# Patient Record
Sex: FEMALE | Race: WHITE | NOT HISPANIC OR LATINO | Employment: UNEMPLOYED | URBAN - METROPOLITAN AREA
[De-identification: names, ages, dates, MRNs, and addresses within clinical notes are randomized per-mention and may not be internally consistent; named-entity substitution may affect disease eponyms.]

---

## 2017-03-02 ENCOUNTER — ALLSCRIPTS OFFICE VISIT (OUTPATIENT)
Dept: OTHER | Facility: OTHER | Age: 57
End: 2017-03-02

## 2017-03-02 DIAGNOSIS — Z13.21 ENCOUNTER FOR SCREENING FOR NUTRITIONAL DISORDER: ICD-10-CM

## 2017-03-02 DIAGNOSIS — J44.9 CHRONIC OBSTRUCTIVE PULMONARY DISEASE (HCC): ICD-10-CM

## 2017-03-02 DIAGNOSIS — Z12.11 ENCOUNTER FOR SCREENING FOR MALIGNANT NEOPLASM OF COLON: ICD-10-CM

## 2017-04-26 ENCOUNTER — TRANSCRIBE ORDERS (OUTPATIENT)
Dept: ADMINISTRATIVE | Facility: HOSPITAL | Age: 57
End: 2017-04-26

## 2017-04-26 DIAGNOSIS — Z12.31 ENCOUNTER FOR MAMMOGRAM TO ESTABLISH BASELINE MAMMOGRAM: Primary | ICD-10-CM

## 2017-04-28 ENCOUNTER — HOSPITAL ENCOUNTER (OUTPATIENT)
Dept: RADIOLOGY | Age: 57
Discharge: HOME | End: 2017-04-28
Payer: COMMERCIAL

## 2017-04-28 DIAGNOSIS — Z12.31 ENCOUNTER FOR MAMMOGRAM TO ESTABLISH BASELINE MAMMOGRAM: ICD-10-CM

## 2017-04-28 DIAGNOSIS — Z13.820 ENCOUNTER FOR SCREENING FOR OSTEOPOROSIS: ICD-10-CM

## 2017-04-28 PROCEDURE — 77080 DXA BONE DENSITY AXIAL: CPT

## 2017-04-28 PROCEDURE — G0202 SCR MAMMO BI INCL CAD: HCPCS

## 2017-05-02 DIAGNOSIS — R92.8 OTHER ABNORMAL AND INCONCLUSIVE FINDINGS ON DIAGNOSTIC IMAGING OF BREAST: ICD-10-CM

## 2017-05-04 ENCOUNTER — LAB CONVERSION - ENCOUNTER (OUTPATIENT)
Dept: OTHER | Facility: OTHER | Age: 57
End: 2017-05-04

## 2017-05-04 LAB
25(OH)D3 SERPL-MCNC: 12 NG/ML (ref 30–100)
A/G RATIO (HISTORICAL): 2 (CALC) (ref 1–2.5)
ALBUMIN SERPL BCP-MCNC: 4.6 G/DL (ref 3.6–5.1)
ALP SERPL-CCNC: 95 U/L (ref 33–130)
ALT SERPL W P-5'-P-CCNC: 17 U/L (ref 6–29)
AST SERPL W P-5'-P-CCNC: 21 U/L (ref 10–35)
BASOPHILS # BLD AUTO: 0.3 %
BASOPHILS # BLD AUTO: 29 CELLS/UL (ref 0–200)
BILIRUB SERPL-MCNC: 0.5 MG/DL (ref 0.2–1.2)
BUN SERPL-MCNC: 6 MG/DL (ref 7–25)
BUN/CREA RATIO (HISTORICAL): 9 (CALC) (ref 6–22)
CALCIUM SERPL-MCNC: 9.7 MG/DL (ref 8.6–10.4)
CHLORIDE SERPL-SCNC: 105 MMOL/L (ref 98–110)
CO2 SERPL-SCNC: 25 MMOL/L (ref 20–31)
CREAT SERPL-MCNC: 0.69 MG/DL (ref 0.5–1.05)
DEPRECATED RDW RBC AUTO: 14.5 % (ref 11–15)
EGFR AFRICAN AMERICAN (HISTORICAL): 114 ML/MIN/1.73M2
EGFR-AMERICAN CALC (HISTORICAL): 98 ML/MIN/1.73M2
EOSINOPHIL # BLD AUTO: 1.6 %
EOSINOPHIL # BLD AUTO: 155 CELLS/UL (ref 15–500)
GAMMA GLOBULIN (HISTORICAL): 2.3 G/DL (CALC) (ref 1.9–3.7)
GLUCOSE (HISTORICAL): 95 MG/DL (ref 65–99)
HCT VFR BLD AUTO: 45.9 % (ref 35–45)
HGB BLD-MCNC: 15.6 G/DL (ref 11.7–15.5)
LYMPHOCYTES # BLD AUTO: 2328 CELLS/UL (ref 850–3900)
LYMPHOCYTES # BLD AUTO: 24 %
MCH RBC QN AUTO: 32.9 PG (ref 27–33)
MCHC RBC AUTO-ENTMCNC: 34 G/DL (ref 32–36)
MCV RBC AUTO: 96.9 FL (ref 80–100)
MONOCYTES # BLD AUTO: 417 CELLS/UL (ref 200–950)
MONOCYTES (HISTORICAL): 4.3 %
NEUTROPHILS # BLD AUTO: 6771 CELLS/UL (ref 1500–7800)
NEUTROPHILS # BLD AUTO: 69.8 %
PLATELET # BLD AUTO: 321 THOUSAND/UL (ref 140–400)
PMV BLD AUTO: 8.6 FL (ref 7.5–12.5)
POTASSIUM SERPL-SCNC: 4 MMOL/L (ref 3.5–5.3)
RBC # BLD AUTO: 4.74 MILLION/UL (ref 3.8–5.1)
SODIUM SERPL-SCNC: 141 MMOL/L (ref 135–146)
TOTAL PROTEIN (HISTORICAL): 6.9 G/DL (ref 6.1–8.1)
TSH SERPL DL<=0.05 MIU/L-ACNC: 1.13 MIU/L
WBC # BLD AUTO: 9.7 THOUSAND/UL (ref 3.8–10.8)

## 2017-05-08 ENCOUNTER — HOSPITAL ENCOUNTER (OUTPATIENT)
Dept: MAMMOGRAPHY | Facility: CLINIC | Age: 57
Discharge: HOME | End: 2017-05-08
Payer: COMMERCIAL

## 2017-05-08 DIAGNOSIS — R92.8 OTHER ABNORMAL AND INCONCLUSIVE FINDINGS ON DIAGNOSTIC IMAGING OF BREAST: ICD-10-CM

## 2017-05-08 PROCEDURE — G0206 DX MAMMO INCL CAD UNI: HCPCS

## 2017-05-11 ENCOUNTER — ALLSCRIPTS OFFICE VISIT (OUTPATIENT)
Dept: OTHER | Facility: OTHER | Age: 57
End: 2017-05-11

## 2017-05-12 ENCOUNTER — LAB CONVERSION - ENCOUNTER (OUTPATIENT)
Dept: OTHER | Facility: OTHER | Age: 57
End: 2017-05-12

## 2017-05-12 LAB — FECAL GLOBIN BY IMMUNOCHEM (HISTORICAL): NORMAL

## 2017-05-18 LAB
CHOLEST SERPL-MCNC: 249 MG/DL (ref 125–200)
CHOLEST/HDLC SERPL: 3.5 (CALC)
HDLC SERPL-MCNC: 72 MG/DL
LDL CHOLESTEROL (HISTORICAL): 159 MG/DL (CALC)
NON-HDL-CHOL (CHOL-HDL) (HISTORICAL): 177 MG/DL (CALC)
TRIGL SERPL-MCNC: 91 MG/DL

## 2017-12-07 ENCOUNTER — ALLSCRIPTS OFFICE VISIT (OUTPATIENT)
Dept: OTHER | Facility: OTHER | Age: 57
End: 2017-12-07

## 2017-12-07 PROCEDURE — G0145 SCR C/V CYTO,THINLAYER,RESCR: HCPCS | Performed by: FAMILY MEDICINE

## 2017-12-07 PROCEDURE — 87624 HPV HI-RISK TYP POOLED RSLT: CPT | Performed by: FAMILY MEDICINE

## 2017-12-11 ENCOUNTER — LAB REQUISITION (OUTPATIENT)
Dept: LAB | Facility: HOSPITAL | Age: 57
End: 2017-12-11
Payer: COMMERCIAL

## 2017-12-11 DIAGNOSIS — Z01.419 ENCOUNTER FOR GYNECOLOGICAL EXAMINATION WITHOUT ABNORMAL FINDING: ICD-10-CM

## 2017-12-13 LAB — HPV RRNA GENITAL QL NAA+PROBE: NORMAL

## 2017-12-14 LAB
LAB AP GYN PRIMARY INTERPRETATION: NORMAL
Lab: NORMAL

## 2018-01-13 VITALS
RESPIRATION RATE: 16 BRPM | SYSTOLIC BLOOD PRESSURE: 152 MMHG | BODY MASS INDEX: 24.91 KG/M2 | DIASTOLIC BLOOD PRESSURE: 80 MMHG | HEIGHT: 66 IN | WEIGHT: 155 LBS | HEART RATE: 90 BPM

## 2018-01-16 NOTE — PROGRESS NOTES
Assessment    1  COPD (chronic obstructive pulmonary disease) (496) (J44 9)   2  Encounter for preventive health examination (V70 0) (Z00 00)   3  History of Screening breast examination (V76 10) (Z12 39)   4  History of Osteoporosis screening (V82 81) (Z13 820)   5  Colon cancer screening (V76 51) (Z12 11)   6  Encounter for vitamin deficiency screening (V77 99) (Z13 21)   7  Cigarette smoker (305 1) (F17 210)    Plan  Colon cancer screening    · (1) OCCULT BLOOD, FECAL IMMUNOCHEMICAL TEST; Status:Active - Retrospective  Authorization; Requested for:02Mar2017;   COPD (chronic obstructive pulmonary disease)    · ProAir  (90 Base) MCG/ACT Inhalation Aerosol Solution; INHALE 2  PUFFS EVERY 4-6 HOURS AS NEEDED   · (1) CBC/PLT/DIFF; Status:Active - Retrospective Authorization; Requested  for:02Mar2017;    · (1) COMPREHENSIVE METABOLIC PANEL; Status:Active - Retrospective Authorization; Requested for:02Mar2017;    · (1) LIPID PANEL, FASTING; Status:Active - Retrospective Authorization; Requested  for:02Mar2017;    · (1) TSH; Status:Active - Retrospective Authorization; Requested for:02Mar2017;   Encounter for vitamin deficiency screening    · (1) VITAMIN D 25-HYDROXY; Status:Active - Retrospective Authorization; Requested  for:02Mar2017; Health Maintenance    · Begin or continue regular aerobic exercise  Gradually work up to at least 3 sessions of 30  minutes of exercise a week ; Status:Complete;   Done: [id number] 11:48PM  SocHx: Cigarette smoker    · We recommend you quit smoking  Time spent counseling today was greater than 3  minutes ; Status:Complete;   Done: [id number] 11:48PM    Check BW, mammogram, Dexa scan, hemoccult  RV to review above and Gyn exam      Discussion/Summary  Advice and education were given regarding weight bearing exercise, calcium supplements, vitamin D supplements and tobacco cessation  1  HM-needs colorectal screening, Gyn exam  mammogram Advised pneumonia shots , Tdap     2  COPD-strongly advised to quit smoking   Suggested PFT's  Requestrs ProAir  3  Cigarette smoker-advised to quit  REcommended lung caner CT screening  History of Present Illness  HM, Adult Female: The patient is being seen for a health maintenance evaluation  General Health: The patient's health since the last visit is described as good  She complains of vision problems  Vision care includes an eye examination more than a year ago  She denies hearing loss  Lifestyle:  She consumes a diverse and healthy diet  (Watching diet  Avoids sweets  Eats vegetables  No soda  )   She exercises regularly  (Has not been exercising  HAs a callous on her foot  )   She uses tobacco  Tobacco Use Duration: 1 cigarette pack(s) per day and started age 13 pack year(s) of cigarette use  She is not ready to quit using tobacco  She consumes alcohol (Se)  She reports drinking several beerzs daily drinks per day  Caffeine several cups daily  No soda  Dietary calcium several servings cheese  Reproductive health: the patient is postmenopausal   LMP about 2 years ago  Screening:   HPI: Kiley Valentin says she has been doing well  She has had a few falls  She is currently unemployed  She has a history of asthma  She gets occ wheezing and uses her albuterol inhaler about once a week  Past Medical History    · History of Osteoporosis screening (V82 81) (Z13 820)   · History of Screening breast examination (V76 10) (Z12 39)    Family History  Mother    · Family history of hypertension (V17 49) (Z82 49)   · Family history of malignant neoplasm of breast (V16 3) (Z80 3)   · Family history of Type 2 diabetes mellitus with hyperglycemia  Family History    · Denied: Family history of osteoporosis    Social History    · Cigarette smoker (305 1) (F17 210)    Current Meds   1  ProAir  (90 Base) MCG/ACT Inhalation Aerosol Solution Recorded    Allergies    1   No Known Drug Allergies    Vitals   Recorded: [id number] 06:05PM   Heart Rate 120   Respiration 18   Systolic 650   Diastolic 84   Height 5 ft 6 in   Weight 154 lb 8 0 oz   BMI Calculated 24 94   BSA Calculated 1 8     Physical Exam    Constitutional   General appearance: No acute distress, well appearing and well nourished  Head and Face   Head and face: Normal     Eyes   Conjunctiva and lids: No swelling, erythema or discharge  Pupils and irises: Equal, round, reactive to light  Ears, Nose, Mouth, and Throat   Otoscopic examination: Tympanic membranes translucent with normal light reflex  Canals patent without erythema  Lips, teeth, and gums: Abnormal   Poor dentition  Oropharynx: Normal with no erythema, edema, exudate or lesions  Neck   Neck: Supple, symmetric, trachea midline, no masses  Thyroid: Normal, no thyromegaly  Pulmonary   Respiratory effort: Abnormal   Diffusely decreased breath sounds  Auscultation of lungs: Clear to auscultation  Cardiovascular   Auscultation of heart: Normal rate and rhythm, normal S1 and S2, no murmurs  Carotid pulses: 2+ bilaterally  Abdominal aorta: Normal     Femoral pulses: 2+ bilaterally  Pedal pulses: 2+ bilaterally  Peripheral vascular exam: Normal     Examination of extremities for edema and/or varicosities: Normal     Abdomen   Abdomen: Non-tender, no masses  Liver and spleen: No hepatomegaly or splenomegaly  Lymphatic   Palpation of lymph nodes in neck: No lymphadenopathy  Palpation of lymph nodes in groin: No lymphadenopathy  Musculoskeletal   Gait and station: Normal     Psychiatric   Mood and affect: Normal        Future Appointments    Date/Time Provider Specialty Site   05/11/2017 07:00 PM ROHAN Culp   [facility]     Signatures   Electronically signed by : ROHAN Nixon ; Mar  2 2017 11:49PM EST                       (Author)

## 2018-01-22 VITALS
RESPIRATION RATE: 18 BRPM | BODY MASS INDEX: 24.83 KG/M2 | DIASTOLIC BLOOD PRESSURE: 84 MMHG | HEART RATE: 120 BPM | SYSTOLIC BLOOD PRESSURE: 146 MMHG | WEIGHT: 154.5 LBS | HEIGHT: 66 IN

## 2018-01-23 VITALS
HEART RATE: 84 BPM | RESPIRATION RATE: 16 BRPM | HEIGHT: 66 IN | BODY MASS INDEX: 24.27 KG/M2 | SYSTOLIC BLOOD PRESSURE: 142 MMHG | WEIGHT: 151 LBS | DIASTOLIC BLOOD PRESSURE: 86 MMHG

## 2018-01-23 NOTE — PROGRESS NOTES
Assessment    1  Encounter for cervical Pap smear with pelvic exam (V76 2,V72 31) (Z01 419)   2  Abnormal mammogram (793 80) (R92 8)    Plan  Abnormal mammogram    · MAMMO DIAGNOSTIC LEFT W CAD; Status:Active - Retrospective Authorization; Requested for:[id number];   Encounter for cervical Pap smear with pelvic exam    · (1) THIN PREP PAP WITH IMAGING; Status:Active - Retrospective Authorization; Requested for:[id number]; Maturation index required? : No  : 2015  HPV? : Regardless of Interpretation    Check Pap smear  Six-month diagnostic mammogram left breast      Discussion/Summary    Gyn exam -normal  Abnormal mammogram-due for f/u 6 month  History of Present Illness  HM, Adult Female: The patient is being seen for a gynecology evaluation  General Health:   Reproductive health:  she reports abnormal menses  (LMP 2 years ago  )   she is sexually active  Screening:      Review of Systems    Genitourinary: incontinence and occ stress incontinence, but no pelvic pain, no vaginal discharge and no unexplained vaginal bleeding    The patient presents with complaints of dysuria (Gets occ urinary burning  Takes probioitc which helps )  Active Problems    1  Abnormal mammogram (793 80) (R92 8)   2  Cigarette smoker (305 1) (F17 210)   3  Colon cancer screening (V76 51) (Z12 11)   4  COPD (chronic obstructive pulmonary disease) (496) (J44 9)   5  Encounter for vitamin deficiency screening (V77 99) (Z13 21)   6  Hyperlipidemia (272 4) (E78 5)   7  Lipid screening (V77 91) (Z13 220)   8   Vitamin D deficiency (268 9) (E55 9)    Past Medical History    · History of Osteoporosis screening (V82 81) (Z13 820)   · History of Screening breast examination (V76 10) (Z12 31)    Family History  Mother    · Family history of hypertension (V17 49) (Z82 49)   · Family history of malignant neoplasm of breast (V16 3) (Z80 3)   · Family history of Type 2 diabetes mellitus with hyperglycemia  Family History    · Denied: Family history of osteoporosis    Social History    · Cigarette smoker (305 1) (F17 210)    Current Meds   1  ProAir  (90 Base) MCG/ACT Inhalation Aerosol Solution; INHALE 2 PUFFS   EVERY 4-6 HOURS AS NEEDED  Requested for: [id number]; Last Rx:02Mar2017   Ordered    Allergies    1  No Known Drug Allergies    Vitals   Recorded: 96CXU1181 06:33PM   Heart Rate 84   Respiration 16   Systolic 305   Diastolic 86   Height 5 ft 6 in   Weight 151 lb    BMI Calculated 24 37   BSA Calculated 1 77   LMP 2015     Physical Exam    Neck   Neck: Supple, symmetric, trachea midline, no masses  Thyroid: Normal, no thyromegaly  Pulmonary   Respiratory effort: Abnormal   Diffusely decreased  Auscultation of lungs: Clear to auscultation  Chest   Breasts: Normal, no dimpling or skin changes appreciated  Palpation of breasts and axillae: Normal, no masses palpated  Abdomen   Abdomen: Non-tender, no masses  Liver and spleen: No hepatomegaly or splenomegaly  Genitourinary   External genitalia and vagina: Normal, no lesions appreciated  Cervix: Normal, no lesions  Lymphatic   Palpation of lymph nodes in neck: No lymphadenopathy  Palpation of lymph nodes in axillae: No lymphadenopathy  Palpation of lymph nodes in groin: No lymphadenopathy  Future Appointments    Date/Time Provider Specialty Site   12/13/2018 06:00 PM ROHAN Keane   [facility]     Signatures   Electronically signed by : ROHAN Gómez ; Dec  7 2017 10:34PM EST                       (Author)

## 2018-02-02 LAB
CHOLEST SERPL-MCNC: 261 MG/DL
CHOLEST/HDLC SERPL: 4 (CALC)
HDLC SERPL-MCNC: 65 MG/DL
LDLC SERPL CALC-MCNC: 173 MG/DL (CALC)
NONHDLC SERPL-MCNC: 196 MG/DL (CALC)
TRIGL SERPL-MCNC: 104 MG/DL

## 2022-12-16 ENCOUNTER — OFFICE VISIT (OUTPATIENT)
Dept: URGENT CARE | Facility: MEDICAL CENTER | Age: 62
End: 2022-12-16

## 2022-12-16 VITALS
HEART RATE: 118 BPM | RESPIRATION RATE: 18 BRPM | TEMPERATURE: 97.6 F | WEIGHT: 134.8 LBS | DIASTOLIC BLOOD PRESSURE: 104 MMHG | OXYGEN SATURATION: 99 % | HEIGHT: 66 IN | SYSTOLIC BLOOD PRESSURE: 162 MMHG | BODY MASS INDEX: 21.66 KG/M2

## 2022-12-16 DIAGNOSIS — S65.412A LACERATION OF BLOOD VESSEL OF LEFT THUMB, INITIAL ENCOUNTER: Primary | ICD-10-CM

## 2022-12-16 RX ORDER — CEPHALEXIN 500 MG/1
500 CAPSULE ORAL EVERY 6 HOURS SCHEDULED
Qty: 28 CAPSULE | Refills: 0 | Status: SHIPPED | OUTPATIENT
Start: 2022-12-16 | End: 2022-12-23

## 2022-12-16 RX ORDER — ALBUTEROL SULFATE 90 UG/1
2 AEROSOL, METERED RESPIRATORY (INHALATION)
COMMUNITY

## 2022-12-16 NOTE — PROGRESS NOTES
[facility]        NAME: Sebastian Bettencourt is a 65 y o  female  : 1960    MRN: [id number]  DATE: 2022  TIME: 4:45 PM    Assessment and Plan   Laceration of blood vessel of left thumb, initial encounter [S65 412A]  1  Laceration of blood vessel of left thumb, initial encounter  Tdap Vaccine greater than or equal to 6yo    cephalexin (KEFLEX) 500 mg capsule            Patient Instructions     Laceration thumb   Keflex as directed  Keep area clean and dry, change dressing twice daily  Return in 7-10 days for suture removal  Follow up with PCP in 3-5 days  Proceed to  ER if symptoms worsen  Chief Complaint     Chief Complaint   Patient presents with   • Laceration     Pt cut her left thumb on a can today  History of Present Illness       62-year-old female who presents complaining of laceration to left thumb  Patient states she was opening a can of being use when she cut herself  Review of Systems   Review of Systems   Constitutional: Negative  HENT: Negative  Eyes: Negative  Respiratory: Negative  Negative for cough, chest tightness, shortness of breath, wheezing and stridor  Cardiovascular: Negative  Negative for chest pain, palpitations and leg swelling  Skin: Positive for wound  Current Medications       Current Outpatient Medications:   •  albuterol (PROVENTIL HFA,VENTOLIN HFA) 90 mcg/act inhaler, Inhale 2 puffs, Disp: , Rfl:   •  cephalexin (KEFLEX) 500 mg capsule, Take 1 capsule (500 mg total) by mouth every 6 (six) hours for 7 days, Disp: 28 capsule, Rfl: 0    Current Allergies     Allergies as of 2022   • (No Known Allergies)            The following portions of the patient's history were reviewed and updated as appropriate: allergies, current medications, past family history, past medical history, past social history, past surgical history and problem list      No past medical history on file      No past surgical history on file     Family History   Problem Relation Age of Onset   • Hypertension Mother    • Breast cancer Mother    • Diabetes type II Mother         WITH HYPERGLYCEMIA         Medications have been verified  Objective   BP (!) 162/104 (BP Location: Right arm)   Pulse (!) 118   Temp 97 6 °F (36 4 °C) (Temporal)   Resp 18   Ht 5' 6" (1 676 m)   Wt 61 1 kg (134 lb 12 8 oz)   SpO2 99%   BMI 21 76 kg/m²          Physical Exam     Physical Exam  Constitutional:       Appearance: Normal appearance  She is well-developed  HENT:      Head: Normocephalic and atraumatic  Right Ear: External ear normal       Left Ear: External ear normal       Nose: Nose normal       Mouth/Throat:      Pharynx: No oropharyngeal exudate  Cardiovascular:      Rate and Rhythm: Normal rate and regular rhythm  Heart sounds: Normal heart sounds  Pulmonary:      Effort: Pulmonary effort is normal  No respiratory distress  Breath sounds: Normal breath sounds  No wheezing or rales  Chest:      Chest wall: No tenderness  Musculoskeletal:      Cervical back: Normal range of motion and neck supple  Lymphadenopathy:      Cervical: No cervical adenopathy  Neurological:      Mental Status: She is alert  Laceration repair    Date/Time: 12/16/2022 4:47 PM  Performed by: Yevgeniy Cortes PA-C  Authorized by: Yevgeniy Cortes PA-C   Consent: Verbal consent obtained    Risks and benefits: risks, benefits and alternatives were discussed  Consent given by: patient  Patient understanding: patient states understanding of the procedure being performed  Patient identity confirmed: verbally with patient  Body area: upper extremity  Location details: left thumb  Laceration length: 3 cm  Foreign bodies: metal  Tendon involvement: none  Nerve involvement: none  Vascular damage: no  Anesthesia: local infiltration    Anesthesia:  Local Anesthetic: lidocaine 1% without epinephrine    Sedation:  Patient sedated: no        Procedure Details:  Preparation: Patient was prepped and draped in the usual sterile fashion    Irrigation solution: saline  Irrigation method: jet lavage  Amount of cleaning: extensive  Debridement: none  Degree of undermining: none  Skin closure: 4-0 nylon  Technique: simple  Approximation: close  Approximation difficulty: simple  Dressing: antibiotic ointment and gauze roll  Patient tolerance: patient tolerated the procedure well with no immediate complications

## 2022-12-16 NOTE — PATIENT INSTRUCTIONS
Laceration thumb   Keflex as directed  Keep area clean and dry, change dressing twice daily  Return in 7-10 days for suture removal  Follow up with PCP in 3-5 days  Proceed to  ER if symptoms worsen  Laceration   WHAT YOU NEED TO KNOW:   A laceration is an injury to the skin and the soft tissue underneath it  Lacerations can happen anywhere on the body  DISCHARGE INSTRUCTIONS:   Return to the emergency department if:   You have heavy bleeding or bleeding that does not stop after 10 minutes of holding firm, direct pressure over the wound  Your wound opens up  Call your doctor if:   You have a fever or chills  Your laceration is red, warm, or swollen  You have red streaks on your skin coming from your wound  You have white or yellow drainage from the wound that smells bad  You have pain that gets worse, even after treatment  You have questions or concerns about your condition or care  Medicines: You may need any of the following:  Prescription pain medicine  may be given  Ask your healthcare provider how to take this medicine safely  Some prescription pain medicines contain acetaminophen  Do not take other medicines that contain acetaminophen without talking to your healthcare provider  Too much acetaminophen may cause liver damage  Prescription pain medicine may cause constipation  Ask your healthcare provider how to prevent or treat constipation  Antibiotics  help treat or prevent a bacterial infection  Take your medicine as directed  Contact your healthcare provider if you think your medicine is not helping or if you have side effects  Tell him or her if you are allergic to any medicine  Keep a list of the medicines, vitamins, and herbs you take  Include the amounts, and when and why you take them  Bring the list or the pill bottles to follow-up visits  Carry your medicine list with you in case of an emergency      Care for your wound as directed:   Do not get your wound wet  until your healthcare provider says it is okay  Do not soak your wound in water  Do not go swimming until your healthcare provider says it is okay  Carefully wash the wound with soap and water  Gently pat the area dry or allow it to air dry  Change your bandages  when they get wet, dirty, or after washing  Apply new, clean bandages as directed  Do not apply elastic bandages or tape too tight  Do not put powders or lotions over your incision  Apply antibiotic ointment as directed  Your healthcare provider may give you antibiotic ointment to put over your wound if you have stitches  If you have strips of tape over your incision, let them dry up and fall off on their own  If they do not fall off within 14 days, gently remove them  If you have glue over your wound, do not remove or pick at it  If your glue comes off, do not replace it with glue that you have at home  Check your wound every day for signs of infection, such as swelling, redness, or pus  Self-care:   Apply ice  on your wound for 15 to 20 minutes every hour or as directed  Use an ice pack, or put crushed ice in a plastic bag  Cover it with a towel  Ice helps prevent tissue damage and decreases swelling and pain  Use a splint as directed  A splint will decrease movement and stress on your wound  It may help it heal faster  A splint may be used for lacerations over joints or areas of your body that bend  Ask your healthcare provider how to apply and remove a splint  Decrease scarring of your wound  by applying ointments as directed  Do not apply ointments until your healthcare provider says it is okay  You may need to wait until your wound is healed  Ask which ointment to buy and how often to use it  After your wound is healed, use sunscreen over the area when you are out in the sun  You should do this for at least 6 months to 1 year after your injury  Follow up with your doctor as directed:   You may need to follow up in 24 to 48 hours to have your wound checked for infection  You will need to return in 3 to 14 days if you have stitches or staples so they can be removed  Care for your wound as directed to prevent infection and help it heal  Write down your questions so you remember to ask them during your visits  © Copyright [employer] 2022 Information is for End User's use only and may not be sold, redistributed or otherwise used for commercial purposes  All illustrations and images included in CareNotes® are the copyrighted property of [employer]  or [employer]  The above information is an  only  It is not intended as medical advice for individual conditions or treatments  Talk to your doctor, nurse or pharmacist before following any medical regimen to see if it is safe and effective for you

## 2022-12-27 ENCOUNTER — OFFICE VISIT (OUTPATIENT)
Dept: URGENT CARE | Facility: MEDICAL CENTER | Age: 62
End: 2022-12-27

## 2022-12-27 VITALS
HEIGHT: 66 IN | HEART RATE: 108 BPM | TEMPERATURE: 97.4 F | DIASTOLIC BLOOD PRESSURE: 92 MMHG | RESPIRATION RATE: 18 BRPM | SYSTOLIC BLOOD PRESSURE: 145 MMHG | WEIGHT: 137 LBS | OXYGEN SATURATION: 93 % | BODY MASS INDEX: 22.02 KG/M2

## 2022-12-27 DIAGNOSIS — S61.012D LACERATION OF LEFT THUMB WITHOUT FOREIGN BODY WITHOUT DAMAGE TO NAIL, SUBSEQUENT ENCOUNTER: Primary | ICD-10-CM

## 2022-12-27 NOTE — PATIENT INSTRUCTIONS
1  Continue current wound care recommendations  2  Return here in 5-7 days for re-evaluation and probable removal of the remaining sutures  3  Return here immediately with any complications or signs of infection

## 2022-12-27 NOTE — PROGRESS NOTES
[facility]        NAME: Mac Nugent is a 65 y o  female  : 1960    MRN: [id number]  DATE: 2022  TIME: 4:12 PM    Assessment and Plan   Laceration of left thumb without foreign body without damage to nail, subsequent encounter [F13 214J]  1  Laceration of left thumb without foreign body without damage to nail, subsequent encounter              Patient Instructions     1  Continue current wound care recommendations  2  Return here in 5-7 days for re-evaluation and probable removal of the remaining sutures  3  Return here immediately with any complications or signs of infection  Chief Complaint     Chief Complaint   Patient presents with   • Suture / Staple Removal     Left thumb           History of Present Illness       58-year-old female patient status post left thumb laceration repair 11 days ago  Patient denies any wound complaints, pain is minimal, no drainage, redness, red streaking, swelling  She presents here for re-evaluation and probable suture removal       Review of Systems   Review of Systems   Constitutional: Negative for chills and fever  HENT: Negative for ear pain and sore throat  Eyes: Negative for pain and visual disturbance  Respiratory: Negative for cough and shortness of breath  Cardiovascular: Negative for chest pain and palpitations  Gastrointestinal: Negative for abdominal pain and vomiting  Genitourinary: Negative for dysuria and hematuria  Musculoskeletal: Negative for arthralgias and back pain  Skin: Positive for wound  Negative for color change and rash  Neurological: Negative for seizures and syncope  All other systems reviewed and are negative          Current Medications       Current Outpatient Medications:   •  albuterol (PROVENTIL HFA,VENTOLIN HFA) 90 mcg/act inhaler, Inhale 2 puffs (Patient not taking: Reported on 2022), Disp: , Rfl:     Current Allergies     Allergies as of 2022   • (No Known Allergies) The following portions of the patient's history were reviewed and updated as appropriate: allergies, current medications, past family history, past medical history, past social history, past surgical history and problem list      No past medical history on file  No past surgical history on file  Family History   Problem Relation Age of Onset   • Hypertension Mother    • Breast cancer Mother    • Diabetes type II Mother         WITH HYPERGLYCEMIA         Medications have been verified  Objective   /92   Pulse (!) 108   Temp (!) 97 4 °F (36 3 °C)   Resp 18   Ht 5' 6" (1 676 m)   Wt 62 1 kg (137 lb)   SpO2 93%   BMI 22 11 kg/m²        Physical Exam     Physical Exam  Constitutional:       Appearance: Normal appearance  HENT:      Head: Normocephalic  Nose: Nose normal    Eyes:      Conjunctiva/sclera: Conjunctivae normal       Pupils: Pupils are equal, round, and reactive to light  Cardiovascular:      Rate and Rhythm: Normal rate  Pulmonary:      Effort: Pulmonary effort is normal    Musculoskeletal:         General: Normal range of motion  Cervical back: Normal range of motion  Skin:     Capillary Refill: Capillary refill takes less than 2 seconds  Comments: Left thumb pad laceration noted with 5 intact sutures  No swelling, redness, drainage, red streaking or any other signs of infection  Neurological:      General: No focal deficit present  Mental Status: She is alert and oriented to person, place, and time  Psychiatric:         Mood and Affect: Mood normal          Behavior: Behavior normal          Suture removal    Date/Time: 12/27/2022 4:10 PM  Performed by: Charli Melton PA-C  Authorized by: Charli Melton PA-C   Universal Protocol:  Consent: Verbal consent obtained    Risks and benefits: risks, benefits and alternatives were discussed  Consent given by: patient  Patient understanding: patient states understanding of the procedure being performed  Patient identity confirmed: verbally with patient        Patient location:  Clinic  Location:     Laterality:  Left    Location:  Upper extremity    Upper extremity location:  Hand    Hand location:  L thumb  Procedure details: Tools used:  Suture removal kit    Wound appearance:  No sign(s) of infection, nontender and clean    Number of sutures removed:  2  Post-procedure details:     Post-removal:  Antibiotic ointment applied and Band-Aid applied  Comments:      After 2 of the 5 sutures removed wound began to separate  Will allow the 3 remaining sutures to stay in for an additional 5-7 days  Will re-evaluate the patient at that time  Patient will return sooner if there are any signs of infection or complication

## 2023-01-02 ENCOUNTER — OFFICE VISIT (OUTPATIENT)
Dept: URGENT CARE | Facility: MEDICAL CENTER | Age: 63
End: 2023-01-02

## 2023-01-02 VITALS
DIASTOLIC BLOOD PRESSURE: 88 MMHG | HEART RATE: 107 BPM | BODY MASS INDEX: 22.02 KG/M2 | WEIGHT: 137 LBS | RESPIRATION RATE: 18 BRPM | OXYGEN SATURATION: 97 % | SYSTOLIC BLOOD PRESSURE: 154 MMHG | HEIGHT: 66 IN | TEMPERATURE: 97.9 F

## 2023-01-02 DIAGNOSIS — S65.412D LACERATION OF BLOOD VESSEL OF LEFT THUMB, SUBSEQUENT ENCOUNTER: Primary | ICD-10-CM

## 2023-01-02 NOTE — PROGRESS NOTES
[facility]        NAME: Yan Bauman is a 65 y o  female  : 1960    MRN: [id number]  DATE: 2023  TIME: 9:23 AM    Assessment and Plan   Laceration of blood vessel of left thumb, subsequent encounter [G62 [id number]]  1  Laceration of blood vessel of left thumb, subsequent encounter              Patient Instructions     1  Your wound is still persistently   Allowing the stitches to remain in longer would be the best course of action being that your wound otherwise looks good  Follow back up in 5 to 7 days for reevaluation and possible suture removal at that time  2   Any signs of infection or other complications return immediately  Chief Complaint     Chief Complaint   Patient presents with   • Suture / Staple Removal     Suture removal to left thumb; patient states sutures with initially placed on ; came in on  and was told to come back today to remove remaining sutures         History of Present Illness       58-year-old female patient returns for reevaluation of left thumb laceration  She was here several days ago and the wound was persistently  and all of the sutures could not be removed  She denies any new wound complaints: No pain, drainage, redness, streaking or any other issues  She is compliant with all wound care recommendations  Review of Systems   Review of Systems   Constitutional: Negative for chills and fever  HENT: Negative for ear pain and sore throat  Eyes: Negative for pain and visual disturbance  Respiratory: Negative for cough and shortness of breath  Cardiovascular: Negative for chest pain and palpitations  Gastrointestinal: Negative for abdominal pain and vomiting  Genitourinary: Negative for dysuria and hematuria  Musculoskeletal: Negative for arthralgias and back pain  Skin: Positive for wound  Negative for color change and rash  Neurological: Negative for seizures and syncope     All other systems reviewed and are negative  Current Medications       Current Outpatient Medications:   •  albuterol (PROVENTIL HFA,VENTOLIN HFA) 90 mcg/act inhaler, Inhale 2 puffs (Patient not taking: Reported on 12/27/2022), Disp: , Rfl:     Current Allergies     Allergies as of 01/02/2023   • (No Known Allergies)            The following portions of the patient's history were reviewed and updated as appropriate: allergies, current medications, past family history, past medical history, past social history, past surgical history and problem list      History reviewed  No pertinent past medical history  History reviewed  No pertinent surgical history  Family History   Problem Relation Age of Onset   • Hypertension Mother    • Breast cancer Mother    • Diabetes type II Mother         WITH HYPERGLYCEMIA         Medications have been verified  Objective   /88   Pulse (!) 107   Temp 97 9 °F (36 6 °C)   Resp 18   Ht 5' 6" (1 676 m)   Wt 62 1 kg (137 lb)   SpO2 97%   BMI 22 11 kg/m²        Physical Exam     Physical Exam  Constitutional:       Appearance: Normal appearance  HENT:      Head: Normocephalic  Nose: Nose normal    Eyes:      Conjunctiva/sclera: Conjunctivae normal       Pupils: Pupils are equal, round, and reactive to light  Cardiovascular:      Rate and Rhythm: Normal rate  Pulmonary:      Effort: Pulmonary effort is normal    Musculoskeletal:      Cervical back: Normal range of motion  Skin:     Capillary Refill: Capillary refill takes less than 2 seconds  Comments: 3 sutures remain intact in the left thumb laceration  There is still persistent wound separation with gentle digital traction  There are no other signs of infection however: No redness, streaking, swelling, drainage or any other issues  Neurological:      Mental Status: She is alert and oriented to person, place, and time           Medical decision making note:  Due to the persistent wound separation and no signs of infection, will allow the stitches to remain in an additional 5 to 7 days  She knows to return at that time for reevaluation and probable suture removal   She knows to return immediately with any signs of infection or other complications

## 2023-01-02 NOTE — PATIENT INSTRUCTIONS
1   Your wound is still persistently   Allowing the stitches to remain in longer would be the best course of action being that your wound otherwise looks good  Follow back up in 5 to 7 days for reevaluation and possible suture removal at that time  2   Any signs of infection or other complications return immediately

## 2023-01-10 ENCOUNTER — OFFICE VISIT (OUTPATIENT)
Dept: URGENT CARE | Facility: MEDICAL CENTER | Age: 63
End: 2023-01-10

## 2023-01-10 VITALS
WEIGHT: 137 LBS | DIASTOLIC BLOOD PRESSURE: 96 MMHG | TEMPERATURE: 98.7 F | HEIGHT: 66 IN | HEART RATE: 94 BPM | SYSTOLIC BLOOD PRESSURE: 148 MMHG | BODY MASS INDEX: 22.02 KG/M2 | OXYGEN SATURATION: 97 % | RESPIRATION RATE: 18 BRPM

## 2023-01-10 DIAGNOSIS — Z48.02 ENCOUNTER FOR REMOVAL OF SUTURES: Primary | ICD-10-CM

## 2023-01-10 NOTE — PATIENT INSTRUCTIONS
Not necessary to apply bacitracin  Keep open to air as much as possible  Monitor for signs of infection which include increased redness, warmth, swelling, pain, drainage, fever/chills, streaking and follow up if these symptoms occur  Follow up with PCP in 3-5 days  Proceed to the ER with worsening symptoms

## 2023-01-10 NOTE — PROGRESS NOTES
[facility]        NAME: Meera Raya is a 65 y o  female  : 1960    MRN: [id number]  DATE: January 10, 2023  TIME: 2:58 PM    Assessment and Plan   Encounter for removal of sutures [Z48 02]  1  Encounter for removal of sutures          Slow wound healing likely due to continued moisture with bacitracin and dressing  Remaining 3 sutures removed today  Patient Instructions     Not necessary to apply bacitracin  Keep open to air as much as possible  Monitor for signs of infection which include increased redness, warmth, swelling, pain, drainage, fever/chills, streaking and follow up if these symptoms occur  Follow up with PCP in 3-5 days  Proceed to the ER with worsening symptoms  Chief Complaint     Chief Complaint   Patient presents with   • Suture / Staple Removal     Pt here for suture removal on left thumb         History of Present Illness       The patient presents today for removal of the remainder 3 sutures to her left thumb  Her sutures were initially placed here on 22  She returned on 22 where 2 of the sutures were removed  It was felt to keep the remainder 3 sutures in to allow for further wound healing  She returned on 23 for a wound check and it was felt that the remaining 3 sutures were still not ready to be removed  She returns today for suture removal  She denies any fever/chills, or signs of wound infection  She continues to apply bacitracin ointment and a bandaid, but states she is trying to leave it air out more  Review of Systems   Review of Systems   Constitutional: Negative for chills, fatigue and fever  Eyes: Negative  Skin: Positive for wound (left thumb)  Negative for rash  Psychiatric/Behavioral: Negative            Current Medications       Current Outpatient Medications:   •  albuterol (PROVENTIL HFA,VENTOLIN HFA) 90 mcg/act inhaler, Inhale 2 puffs, Disp: , Rfl:     Current Allergies     Allergies as of 01/10/2023   • (No Known Allergies)            The following portions of the patient's history were reviewed and updated as appropriate: allergies, current medications, past family history, past medical history, past social history, past surgical history and problem list      No past medical history on file  No past surgical history on file  Family History   Problem Relation Age of Onset   • Hypertension Mother    • Breast cancer Mother    • Diabetes type II Mother         WITH HYPERGLYCEMIA         Medications have been verified  Objective   /96 (BP Location: Left arm)   Pulse 94   Temp 98 7 °F (37 1 °C) (Temporal)   Resp 18   Ht 5' 6" (1 676 m)   Wt 62 1 kg (137 lb)   SpO2 97%   BMI 22 11 kg/m²          Physical Exam     Physical Exam  Vitals and nursing note reviewed  Constitutional:       General: She is not in acute distress  Appearance: Normal appearance  She is not ill-appearing  HENT:      Head: Normocephalic and atraumatic  Right Ear: External ear normal       Left Ear: External ear normal       Nose: Nose normal       Mouth/Throat:      Lips: Pink  Mouth: Mucous membranes are moist       Pharynx: Oropharynx is clear  Eyes:      General: Vision grossly intact  Extraocular Movements: Extraocular movements intact  Pupils: Pupils are equal, round, and reactive to light  Cardiovascular:      Rate and Rhythm: Normal rate and regular rhythm  Heart sounds: Normal heart sounds  No murmur heard  Pulmonary:      Effort: Pulmonary effort is normal       Breath sounds: Normal breath sounds  Abdominal:      General: Abdomen is flat  Bowel sounds are normal       Palpations: Abdomen is soft  Musculoskeletal:         General: Normal range of motion  Cervical back: Normal range of motion  Skin:     General: Skin is warm  Findings: Wound (L thumb) present  No rash  Comments: L distal thumb on palmar aspect with 3 intact sutures   Wound edges not fully approximated and fragile  Peeling skin noted at suture sites  Neurological:      Mental Status: She is alert and oriented to person, place, and time  Motor: Motor function is intact  Psychiatric:         Attention and Perception: Attention normal          Mood and Affect: Mood normal        Suture removal    Date/Time: 1/10/2023 3:08 PM  Performed by: CHERRY Hernandez  Authorized by: CHERRY Hernandez   Universal Protocol:  Consent: Verbal consent obtained  Consent given by: patient        Patient location:  Clinic  Location:     Laterality:  Left    Location:  Upper extremity    Upper extremity location:  Hand    Hand location:  L thumb  Procedure details: Tools used:  Suture removal kit, scissors and tweezers    Wound appearance:  No sign(s) of infection    Number of sutures removed:  3  Post-procedure details:     Post-removal:  No dressing applied    Patient tolerance of procedure:   Tolerated well, no immediate complications

## 2025-05-27 ENCOUNTER — OFFICE VISIT (OUTPATIENT)
Dept: FAMILY MEDICINE CLINIC | Facility: MEDICAL CENTER | Age: 65
End: 2025-05-27
Payer: COMMERCIAL

## 2025-05-27 VITALS
HEIGHT: 66 IN | HEART RATE: 98 BPM | OXYGEN SATURATION: 96 % | BODY MASS INDEX: 20.76 KG/M2 | RESPIRATION RATE: 18 BRPM | DIASTOLIC BLOOD PRESSURE: 82 MMHG | WEIGHT: 129.2 LBS | TEMPERATURE: 99.5 F | SYSTOLIC BLOOD PRESSURE: 158 MMHG

## 2025-05-27 DIAGNOSIS — L60.8 DEFORMITY OF TOENAIL: ICD-10-CM

## 2025-05-27 DIAGNOSIS — R06.2 WHEEZES: ICD-10-CM

## 2025-05-27 DIAGNOSIS — Z13.29 THYROID DISORDER SCREEN: ICD-10-CM

## 2025-05-27 DIAGNOSIS — I10 PRIMARY HYPERTENSION: ICD-10-CM

## 2025-05-27 DIAGNOSIS — Z72.0 TOBACCO ABUSE: ICD-10-CM

## 2025-05-27 DIAGNOSIS — Z76.89 ENCOUNTER TO ESTABLISH CARE: Primary | ICD-10-CM

## 2025-05-27 DIAGNOSIS — Z13.0 SCREENING FOR DEFICIENCY ANEMIA: ICD-10-CM

## 2025-05-27 DIAGNOSIS — Z13.220 SCREENING CHOLESTEROL LEVEL: ICD-10-CM

## 2025-05-27 DIAGNOSIS — Z13.1 SCREENING FOR DIABETES MELLITUS (DM): ICD-10-CM

## 2025-05-27 PROCEDURE — 99204 OFFICE O/P NEW MOD 45 MIN: CPT | Performed by: STUDENT IN AN ORGANIZED HEALTH CARE EDUCATION/TRAINING PROGRAM

## 2025-05-27 PROCEDURE — 99406 BEHAV CHNG SMOKING 3-10 MIN: CPT | Performed by: STUDENT IN AN ORGANIZED HEALTH CARE EDUCATION/TRAINING PROGRAM

## 2025-05-27 RX ORDER — AMLODIPINE BESYLATE 5 MG/1
5 TABLET ORAL DAILY
Qty: 30 TABLET | Refills: 0 | Status: SHIPPED | OUTPATIENT
Start: 2025-05-27

## 2025-05-27 RX ORDER — ALBUTEROL SULFATE 90 UG/1
2 INHALANT RESPIRATORY (INHALATION) EVERY 4 HOURS PRN
Qty: 18 G | Refills: 0 | Status: SHIPPED | OUTPATIENT
Start: 2025-05-27

## 2025-05-27 NOTE — PROGRESS NOTES
:  Assessment & Plan  Encounter to establish care         Screening cholesterol level    Orders:    Lipid Panel with Direct LDL reflex; Future    Screening for diabetes mellitus (DM)    Orders:    Comprehensive metabolic panel; Future    Thyroid disorder screen    Orders:    TSH, 3rd generation with Free T4 reflex; Future    Screening for deficiency anemia    Orders:    CBC and differential; Future    Wheezes  Preemptively discussed with patient pursuing PFTs  Orders:    albuterol (PROVENTIL HFA,VENTOLIN HFA) 90 mcg/act inhaler; Inhale 2 puffs every 4 (four) hours as needed for wheezing    Primary hypertension  No recent lab work on file, we will proceed with calcium channel blocker, discussed medication and potential side effects, patient agreeable to starting  Start amlodipine 5 mg p.o. daily, return to office in 2 weeks for medication monitoring  Orders:    amLODIPine (NORVASC) 5 mg tablet; Take 1 tablet (5 mg total) by mouth daily    Tobacco abuse  -Smoking cessation counseling provided  - Almost one-third of all cancers are caused by smoking and there is an especially high incidence of smoking leading to lung, head and neck, cervical, bladder, kidney, and pancreatic cancers.   - Persistent tobacco use is consistent with poorer outcomes including complications of treatment, progressive disease, increased comorbidity, and second primaries.   - Smoking cessation is important in prevention of cancer, but is even more vital for survivors to improve survival.  - Tobacco Use/Cessation.  I assessed Alexia Bullock to be in a pre-contemplative stage with respect to tobacco use.  - I advised patient to quit, and offered support.  - 5 minutes spent counseling on this topic         Deformity of toenail    Orders:    Ambulatory Referral to Podiatry; Future        History of Present Illness     Alexia Bullock is a 64 y.o. female who presents to establish care.  Patient does use albuterol inhaler as needed, experiences wheezing,  "smokes about a pack and 1/2/day.  Patient is concerned as her blood pressures have been elevated recently.  She also request referral to podiatry to address her toenails.      Review of Systems  As noted in HPI   Objective   /82 (BP Location: Right arm)   Pulse 98   Temp 99.5 °F (37.5 °C) (Temporal)   Resp 18   Ht 5' 6\" (1.676 m)   Wt 58.6 kg (129 lb 3.2 oz)   SpO2 96%   BMI 20.85 kg/m²      Physical Exam  Vitals reviewed.   Constitutional:       General: She is not in acute distress.     Appearance: Normal appearance.   HENT:      Head: Normocephalic and atraumatic.     Eyes:      Conjunctiva/sclera: Conjunctivae normal.       Cardiovascular:      Rate and Rhythm: Normal rate and regular rhythm.      Pulses: Normal pulses.      Heart sounds: Normal heart sounds.   Pulmonary:      Effort: Pulmonary effort is normal.      Breath sounds: Normal breath sounds.   Abdominal:      General: Abdomen is flat.      Palpations: Abdomen is soft.      Tenderness: There is no abdominal tenderness.     Musculoskeletal:      Cervical back: Neck supple.      Right lower leg: No edema.      Left lower leg: No edema.     Skin:     General: Skin is warm and dry.     Neurological:      Mental Status: She is alert and oriented to person, place, and time.     Psychiatric:         Mood and Affect: Mood normal.         Behavior: Behavior normal.         Thought Content: Thought content normal.           "

## 2025-05-29 ENCOUNTER — APPOINTMENT (OUTPATIENT)
Dept: LAB | Facility: MEDICAL CENTER | Age: 65
End: 2025-05-29
Attending: STUDENT IN AN ORGANIZED HEALTH CARE EDUCATION/TRAINING PROGRAM
Payer: COMMERCIAL

## 2025-05-29 DIAGNOSIS — Z13.29 THYROID DISORDER SCREEN: ICD-10-CM

## 2025-05-29 DIAGNOSIS — Z13.220 SCREENING CHOLESTEROL LEVEL: ICD-10-CM

## 2025-05-29 DIAGNOSIS — Z13.1 SCREENING FOR DIABETES MELLITUS (DM): ICD-10-CM

## 2025-05-29 DIAGNOSIS — Z13.0 SCREENING FOR DEFICIENCY ANEMIA: ICD-10-CM

## 2025-05-29 LAB
ALBUMIN SERPL BCG-MCNC: 4.5 G/DL (ref 3.5–5)
ALP SERPL-CCNC: 85 U/L (ref 34–104)
ALT SERPL W P-5'-P-CCNC: 19 U/L (ref 7–52)
ANION GAP SERPL CALCULATED.3IONS-SCNC: 9 MMOL/L (ref 4–13)
AST SERPL W P-5'-P-CCNC: 15 U/L (ref 13–39)
BASOPHILS # BLD AUTO: 0.13 THOUSANDS/ÂΜL (ref 0–0.1)
BASOPHILS NFR BLD AUTO: 2 % (ref 0–1)
BILIRUB SERPL-MCNC: 0.33 MG/DL (ref 0.2–1)
BUN SERPL-MCNC: 8 MG/DL (ref 5–25)
CALCIUM SERPL-MCNC: 10.1 MG/DL (ref 8.4–10.2)
CHLORIDE SERPL-SCNC: 103 MMOL/L (ref 96–108)
CHOLEST SERPL-MCNC: 236 MG/DL (ref ?–200)
CO2 SERPL-SCNC: 32 MMOL/L (ref 21–32)
CREAT SERPL-MCNC: 0.54 MG/DL (ref 0.6–1.3)
EOSINOPHIL # BLD AUTO: 0.39 THOUSAND/ÂΜL (ref 0–0.61)
EOSINOPHIL NFR BLD AUTO: 5 % (ref 0–6)
ERYTHROCYTE [DISTWIDTH] IN BLOOD BY AUTOMATED COUNT: 12.4 % (ref 11.6–15.1)
GFR SERPL CREATININE-BSD FRML MDRD: 100 ML/MIN/1.73SQ M
GLUCOSE P FAST SERPL-MCNC: 96 MG/DL (ref 65–99)
HCT VFR BLD AUTO: 48.2 % (ref 34.8–46.1)
HDLC SERPL-MCNC: 51 MG/DL
HGB BLD-MCNC: 15.8 G/DL (ref 11.5–15.4)
IMM GRANULOCYTES # BLD AUTO: 0.02 THOUSAND/UL (ref 0–0.2)
IMM GRANULOCYTES NFR BLD AUTO: 0 % (ref 0–2)
LDLC SERPL CALC-MCNC: 167 MG/DL (ref 0–100)
LYMPHOCYTES # BLD AUTO: 2.45 THOUSANDS/ÂΜL (ref 0.6–4.47)
LYMPHOCYTES NFR BLD AUTO: 29 % (ref 14–44)
MCH RBC QN AUTO: 33.1 PG (ref 26.8–34.3)
MCHC RBC AUTO-ENTMCNC: 32.8 G/DL (ref 31.4–37.4)
MCV RBC AUTO: 101 FL (ref 82–98)
MONOCYTES # BLD AUTO: 0.66 THOUSAND/ÂΜL (ref 0.17–1.22)
MONOCYTES NFR BLD AUTO: 8 % (ref 4–12)
NEUTROPHILS # BLD AUTO: 4.67 THOUSANDS/ÂΜL (ref 1.85–7.62)
NEUTS SEG NFR BLD AUTO: 56 % (ref 43–75)
NRBC BLD AUTO-RTO: 0 /100 WBCS
PLATELET # BLD AUTO: 429 THOUSANDS/UL (ref 149–390)
PMV BLD AUTO: 10.5 FL (ref 8.9–12.7)
POTASSIUM SERPL-SCNC: 4.5 MMOL/L (ref 3.5–5.3)
PROT SERPL-MCNC: 7 G/DL (ref 6.4–8.4)
RBC # BLD AUTO: 4.77 MILLION/UL (ref 3.81–5.12)
SODIUM SERPL-SCNC: 144 MMOL/L (ref 135–147)
TRIGL SERPL-MCNC: 89 MG/DL (ref ?–150)
TSH SERPL DL<=0.05 MIU/L-ACNC: 1.79 UIU/ML (ref 0.45–4.5)
WBC # BLD AUTO: 8.32 THOUSAND/UL (ref 4.31–10.16)

## 2025-05-29 PROCEDURE — 80053 COMPREHEN METABOLIC PANEL: CPT

## 2025-05-29 PROCEDURE — 85025 COMPLETE CBC W/AUTO DIFF WBC: CPT

## 2025-05-29 PROCEDURE — 84443 ASSAY THYROID STIM HORMONE: CPT

## 2025-05-29 PROCEDURE — 36415 COLL VENOUS BLD VENIPUNCTURE: CPT

## 2025-05-29 PROCEDURE — 80061 LIPID PANEL: CPT

## 2025-05-30 ENCOUNTER — RESULTS FOLLOW-UP (OUTPATIENT)
Dept: FAMILY MEDICINE CLINIC | Facility: MEDICAL CENTER | Age: 65
End: 2025-05-30

## 2025-06-02 DIAGNOSIS — Z12.31 ENCOUNTER FOR SCREENING MAMMOGRAM FOR BREAST CANCER: ICD-10-CM

## 2025-06-02 DIAGNOSIS — Z12.11 SCREEN FOR COLON CANCER: Primary | ICD-10-CM

## 2025-06-02 DIAGNOSIS — Z13.820 SCREENING FOR OSTEOPOROSIS: ICD-10-CM

## 2025-06-12 ENCOUNTER — OFFICE VISIT (OUTPATIENT)
Dept: FAMILY MEDICINE CLINIC | Facility: MEDICAL CENTER | Age: 65
End: 2025-06-12
Payer: COMMERCIAL

## 2025-06-12 VITALS
HEART RATE: 101 BPM | HEIGHT: 66 IN | BODY MASS INDEX: 20.6 KG/M2 | SYSTOLIC BLOOD PRESSURE: 122 MMHG | DIASTOLIC BLOOD PRESSURE: 80 MMHG | RESPIRATION RATE: 20 BRPM | TEMPERATURE: 97.7 F | OXYGEN SATURATION: 95 % | WEIGHT: 128.2 LBS

## 2025-06-12 DIAGNOSIS — E78.00 PURE HYPERCHOLESTEROLEMIA: ICD-10-CM

## 2025-06-12 DIAGNOSIS — I10 PRIMARY HYPERTENSION: Primary | ICD-10-CM

## 2025-06-12 DIAGNOSIS — Z71.2 ENCOUNTER TO DISCUSS TEST RESULTS: ICD-10-CM

## 2025-06-12 DIAGNOSIS — R79.89 ELEVATED PLATELET COUNT: ICD-10-CM

## 2025-06-12 DIAGNOSIS — Z12.11 SCREEN FOR COLON CANCER: ICD-10-CM

## 2025-06-12 PROCEDURE — 99214 OFFICE O/P EST MOD 30 MIN: CPT | Performed by: STUDENT IN AN ORGANIZED HEALTH CARE EDUCATION/TRAINING PROGRAM

## 2025-06-12 RX ORDER — AMLODIPINE BESYLATE 5 MG/1
5 TABLET ORAL DAILY
Qty: 90 TABLET | Refills: 1 | Status: SHIPPED | OUTPATIENT
Start: 2025-06-12

## 2025-06-12 NOTE — PROGRESS NOTES
:  Assessment & Plan  Primary hypertension  Blood pressure at goal and tolerating new medication well, continue amlodipine 5 mg p.o. daily  Orders:    amLODIPine (NORVASC) 5 mg tablet; Take 1 tablet (5 mg total) by mouth daily    Screen for colon cancer  Discussed options for screening, patient would like to proceed with Cologuard test  Orders:    Cologuard    Elevated platelet count  Monitor in 6 months, further workup as appropriate  Orders:    CBC (Includes Diff/Plt) (Refl); Future    Pure hypercholesterolemia  The 10-year ASCVD risk score (Cyndi NAVA, et al., 2019) is: 12.1%    Values used to calculate the score:      Age: 64 years      Clincally relevant sex: Female      Is Non- : No      Diabetic: No      Tobacco smoker: Yes      Systolic Blood Pressure: 122 mmHg      Is BP treated: Yes      HDL Cholesterol: 51 mg/dL      Total Cholesterol: 236 mg/dL  Discussed recent lipid panel, ASCVD risk score and benefit of statin therapy to prevent adverse cardiovascular event  Patient declines statin therapy at this time       Encounter to discuss test results  I have reviewed pertinent labs:  CBC:   Lab Results   Component Value Date    WBC 8.32 05/29/2025    RBC 4.77 05/29/2025    HGB 15.8 (H) 05/29/2025    HCT 48.2 (H) 05/29/2025     (H) 05/29/2025     (H) 05/29/2025    MCH 33.1 05/29/2025    MCHC 32.8 05/29/2025    RDW 12.4 05/29/2025    MPV 10.5 05/29/2025    NEUTROABS 4.67 05/29/2025     CMP:   Lab Results   Component Value Date    SODIUM 144 05/29/2025    K 4.5 05/29/2025     05/29/2025    CO2 32 05/29/2025    AGAP 9 05/29/2025    BUN 8 05/29/2025    CREATININE 0.54 (L) 05/29/2025    GLUF 96 05/29/2025    CALCIUM 10.1 05/29/2025    AST 15 05/29/2025    ALT 19 05/29/2025    ALKPHOS 85 05/29/2025    TP 7.0 05/29/2025    ALB 4.5 05/29/2025    TBILI 0.33 05/29/2025    EGFR 100 05/29/2025     Lipid Profile:   Lab Results   Component Value Date    CHOLESTEROL 236 (H)  "05/29/2025    HDL 51 05/29/2025    TRIG 89 05/29/2025    LDLCALC 167 (H) 05/29/2025    NONHDLC 177 (H) 05/17/2017     Thyroid Studies:   Lab Results   Component Value Date    SBE4IEGCQGWH 1.789 05/29/2025                    History of Present Illness     Alexia Bullock is a 64 y.o. female who presents for new medication follow-up.  She states she is tolerating amlodipine well without issue.    Review of Systems    As noted in HPI    Objective   /80 (Patient Position: Sitting, Cuff Size: Standard)   Pulse 101   Temp 97.7 °F (36.5 °C) (Temporal)   Resp 20   Ht 5' 6\" (1.676 m)   Wt 58.2 kg (128 lb 3.2 oz)   SpO2 95%   BMI 20.69 kg/m²      Physical Exam  Vitals reviewed.   Constitutional:       General: She is not in acute distress.     Appearance: Normal appearance.   HENT:      Head: Normocephalic and atraumatic.     Eyes:      Conjunctiva/sclera: Conjunctivae normal.       Cardiovascular:      Rate and Rhythm: Normal rate and regular rhythm.      Pulses: Normal pulses.      Heart sounds: Normal heart sounds.   Pulmonary:      Effort: Pulmonary effort is normal.      Breath sounds: Normal breath sounds.     Musculoskeletal:      Right lower leg: No edema.      Left lower leg: No edema.     Skin:     General: Skin is warm and dry.     Neurological:      Mental Status: She is alert and oriented to person, place, and time.     Psychiatric:         Mood and Affect: Mood normal.         Behavior: Behavior normal.         Thought Content: Thought content normal.           "

## 2025-07-02 LAB — COLOGUARD RESULT REPORTABLE: POSITIVE

## 2025-08-01 ENCOUNTER — OFFICE VISIT (OUTPATIENT)
Dept: PODIATRY | Facility: CLINIC | Age: 65
End: 2025-08-01

## 2025-08-01 VITALS — HEART RATE: 128 BPM | BODY MASS INDEX: 19.58 KG/M2 | HEIGHT: 66 IN | WEIGHT: 121.8 LBS | OXYGEN SATURATION: 97 %

## 2025-08-01 DIAGNOSIS — L60.8 PINCER NAIL DEFORMITY: Primary | ICD-10-CM

## 2025-08-01 DIAGNOSIS — L60.8 DEFORMITY OF TOENAIL: ICD-10-CM
